# Patient Record
Sex: MALE | Race: BLACK OR AFRICAN AMERICAN | NOT HISPANIC OR LATINO | Employment: FULL TIME | URBAN - METROPOLITAN AREA
[De-identification: names, ages, dates, MRNs, and addresses within clinical notes are randomized per-mention and may not be internally consistent; named-entity substitution may affect disease eponyms.]

---

## 2019-05-11 ENCOUNTER — HOSPITAL ENCOUNTER (EMERGENCY)
Facility: HOSPITAL | Age: 49
Discharge: HOME | End: 2019-05-11
Attending: EMERGENCY MEDICINE
Payer: COMMERCIAL

## 2019-05-11 VITALS
OXYGEN SATURATION: 100 % | RESPIRATION RATE: 18 BRPM | TEMPERATURE: 98 F | DIASTOLIC BLOOD PRESSURE: 80 MMHG | WEIGHT: 175 LBS | SYSTOLIC BLOOD PRESSURE: 142 MMHG | HEIGHT: 70 IN | BODY MASS INDEX: 25.05 KG/M2 | HEART RATE: 61 BPM

## 2019-05-11 DIAGNOSIS — M25.512 LEFT SHOULDER PAIN: ICD-10-CM

## 2019-05-11 PROCEDURE — 99283 EMERGENCY DEPT VISIT LOW MDM: CPT

## 2019-05-11 NOTE — ED PROVIDER NOTES
"Encounter Date: 5/11/2019    SCRIBE #1 NOTE: I, Juju Duarte, am scribing for, and in the presence of,  Juanita Diana PA-C . I have scribed the following portions of the note - Other sections scribed: HPI, ROS.       History     Chief Complaint   Patient presents with    Shoulder Pain     "I was lifting something and it felt like something tore in my shoulder". Lt shoulder pain x 4 days.     CC: Shoulder Pain    HPI: This 49 y/o male presents to the ED for emergent evaluation of L shoulder pain for x4 days. Patient reports pain began after lifting a tire and "it felt like something tore in my shoulder." Patient denies hx of shoulder pain. Patient also denies arm pain, fever, headache, leg pain, chest pain, leg swelling, or N/V/D. No alleviating or exacerbating factors reported.        The history is provided by the patient. No  was used.     Review of patient's allergies indicates:  No Known Allergies  History reviewed. No pertinent past medical history.  Past Surgical History:   Procedure Laterality Date    THYROID SURGERY       History reviewed. No pertinent family history.  Social History     Tobacco Use    Smoking status: Current Some Day Smoker     Types: Cigars   Substance Use Topics    Alcohol use: Yes    Drug use: Not Currently     Review of Systems   Constitutional: Negative for chills and fever.   HENT: Negative for congestion, ear discharge, ear pain, nosebleeds, rhinorrhea and sore throat.    Respiratory: Negative for cough and chest tightness.    Cardiovascular: Negative for chest pain.   Gastrointestinal: Negative for abdominal pain, diarrhea, nausea and vomiting.   Genitourinary: Negative for dysuria, flank pain, hematuria and urgency.   Musculoskeletal: Negative for back pain, myalgias and neck pain.        + left shoulder pain   Skin: Negative for rash.   Neurological: Negative for dizziness, weakness, light-headedness, numbness and headaches.   Psychiatric/Behavioral: " Negative for agitation.       Physical Exam     Initial Vitals [05/11/19 0951]   BP Pulse Resp Temp SpO2   (!) 142/84 75 18 98.5 °F (36.9 °C) 100 %      MAP       --         Physical Exam    Nursing note and vitals reviewed.  Constitutional: Vital signs are normal. He appears well-developed and well-nourished. He is not diaphoretic. He is cooperative.  Non-toxic appearance. He does not have a sickly appearance. He does not appear ill. No distress.   HENT:   Head: Normocephalic and atraumatic.   Right Ear: External ear normal.   Left Ear: External ear normal.   Nose: Nose normal.   Mouth/Throat: Uvula is midline, oropharynx is clear and moist and mucous membranes are normal.   Eyes: Conjunctivae, EOM and lids are normal. Pupils are equal, round, and reactive to light.   Neck: Trachea normal, normal range of motion, full passive range of motion without pain and phonation normal. Neck supple.   Cardiovascular: Normal rate, regular rhythm, normal heart sounds and intact distal pulses. Exam reveals no gallop and no friction rub.    No murmur heard.  Pulses:       Radial pulses are 2+ on the right side, and 2+ on the left side.   Capillary refill less than 2 sec in bilateral upper extremities.   Pulmonary/Chest: Effort normal and breath sounds normal. No respiratory distress. He has no decreased breath sounds. He has no wheezes. He has no rhonchi. He has no rales.   Abdominal: Soft. Normal appearance and bowel sounds are normal. He exhibits no distension. There is no tenderness.   Musculoskeletal: Normal range of motion.        Left shoulder: He exhibits pain. He exhibits normal range of motion, no tenderness, no bony tenderness, no swelling, no effusion, no deformity, no laceration, no spasm, normal pulse and normal strength.        Arms:  There is pain of the left posterior shoulder with active ROM. No obvious deformity or evidence of trauma. Peripheral strength and sensation intact. Peripheral pulses intact. No  swelling.    Neurological: He is alert and oriented to person, place, and time. He has normal strength. No cranial nerve deficit or sensory deficit. He exhibits normal muscle tone. Coordination and gait normal. GCS eye subscore is 4. GCS verbal subscore is 5. GCS motor subscore is 6.   Skin: Skin is warm and dry. Capillary refill takes less than 2 seconds. No abrasion, no bruising, no burn, no ecchymosis, no laceration, no lesion, no rash and no abscess noted. No erythema.   Psychiatric: He has a normal mood and affect. His speech is normal and behavior is normal. Judgment and thought content normal. Cognition and memory are normal.         ED Course   Procedures  Labs Reviewed - No data to display       Imaging Results          X-Ray Shoulder Trauma Left (Final result)  Result time 05/11/19 11:16:00    Final result by Chilango Levine MD (05/11/19 11:16:00)                 Impression:      No evidence of fracture or dislocation.    Mild degenerative change of the AC joint.      Electronically signed by: Chilango Levine MD  Date:    05/11/2019  Time:    11:16             Narrative:    EXAMINATION:  XR SHOULDER TRAUMA 3 VIEW LEFT    CLINICAL HISTORY:  Pain in left shoulder    TECHNIQUE:  Three views of the shoulder were performed.    COMPARISON:  No priors    FINDINGS:  The osseous structures appear intact without evidence of a displaced fracture or osseous destructive lesion.  No evidence of dislocation.    Mild degenerative change of the AC joint.  No calcifications to suggest tendonitis.                                       APC / Resident Notes:   This is an evaluation of a 48 y.o. male that presents to the Emergency Department for left shoulder pain 2/2 lifting a tire 4 days ago.  Denies further symptoms.  Denies attempted treatment prior to arrival.    Patient is non-toxic, afebrile and well appearing. Exam findings consistent with left shoulder pain, likely 2/2 AC joint injury versus rotator cuff injury. Xray  left shoulder unrevealing for acute fracture dislocation.  There is mild degenerative changes of the AC joint noted. No destructive osseous process.    If available, past records have been reviewed.  Vitals are reassuring.    ED course:  Patient declined medications for pain. I have discussed exercises to increase shoulder flexibility including walk the wall exercise and pendulum exercise.  I will recommend Tylenol or NSAIDs for pain.  I have discussed follow-up with Orthopedics.  I feel this patient is stable for discharge.  ED return precautions given.    The diagnosis and treatment plan have been discussed with the patient. All questions and concerns have been addressed. Patient expressed understanding. An educational information sheet was given to the patient prior to discharge.     Juanita Diana PA-C         Scribe Attestation:   Scribe #1: I performed the above scribed service and the documentation accurately describes the services I performed. I attest to the accuracy of the note.    Attending Attestation:           Physician Attestation for Scribe:  Physician Attestation Statement for Scribe #1: I, Juanita Diana PA-C , reviewed documentation, as scribed by Juju Duarte in my presence, and it is both accurate and complete.                    Clinical Impression:       ICD-10-CM ICD-9-CM   1. Left shoulder pain M25.512 719.41         Disposition:   Disposition: Discharged  Condition: Stable                        Juanita Diana PA-C  05/11/19 [id number]

## 2019-05-11 NOTE — ED TRIAGE NOTES
"Patient reports left shoulder pain x 4 pain after lifting something heavy and hearing/feeling "a rip". Denies taking anything for the pain PTA.  "

## 2019-05-11 NOTE — DISCHARGE INSTRUCTIONS
Please do the shoulder exercises that we discussed--walk up the wall exercise and figure-of-8 exercise.    You can take ibuprofen or Tylenol for pain.    If your shoulder pain continues, you will need to follow up with Orthopedics.    Return to the emergency room for any concerns or emergencies.

## 2021-12-09 ENCOUNTER — TELEPHONE (OUTPATIENT)
Dept: ADMINISTRATIVE | Facility: OTHER | Age: 51
End: 2021-12-09
Payer: COMMERCIAL

## 2023-05-11 ENCOUNTER — OFFICE VISIT (OUTPATIENT)
Dept: INTERNAL MEDICINE | Facility: CLINIC | Age: 53
End: 2023-05-11
Payer: COMMERCIAL

## 2023-05-11 VITALS
HEIGHT: 70 IN | OXYGEN SATURATION: 98 % | DIASTOLIC BLOOD PRESSURE: 74 MMHG | WEIGHT: 168.88 LBS | SYSTOLIC BLOOD PRESSURE: 130 MMHG | HEART RATE: 64 BPM | BODY MASS INDEX: 24.18 KG/M2 | RESPIRATION RATE: 14 BRPM

## 2023-05-11 DIAGNOSIS — R22.42 LOCALIZED SWELLING OF LEFT FOOT: ICD-10-CM

## 2023-05-11 DIAGNOSIS — R21 RASH IN ADULT: Primary | ICD-10-CM

## 2023-05-11 PROCEDURE — 3008F BODY MASS INDEX DOCD: CPT | Mod: CPTII,S$GLB,,

## 2023-05-11 PROCEDURE — 1159F MED LIST DOCD IN RCRD: CPT | Mod: CPTII,S$GLB,,

## 2023-05-11 PROCEDURE — 3008F PR BODY MASS INDEX (BMI) DOCUMENTED: ICD-10-PCS | Mod: CPTII,S$GLB,,

## 2023-05-11 PROCEDURE — 3075F PR MOST RECENT SYSTOLIC BLOOD PRESS GE 130-139MM HG: ICD-10-PCS | Mod: CPTII,S$GLB,,

## 2023-05-11 PROCEDURE — 3078F DIAST BP <80 MM HG: CPT | Mod: CPTII,S$GLB,,

## 2023-05-11 PROCEDURE — 3078F PR MOST RECENT DIASTOLIC BLOOD PRESSURE < 80 MM HG: ICD-10-PCS | Mod: CPTII,S$GLB,,

## 2023-05-11 PROCEDURE — 1160F RVW MEDS BY RX/DR IN RCRD: CPT | Mod: CPTII,S$GLB,,

## 2023-05-11 PROCEDURE — 1159F PR MEDICATION LIST DOCUMENTED IN MEDICAL RECORD: ICD-10-PCS | Mod: CPTII,S$GLB,,

## 2023-05-11 PROCEDURE — 99999 PR PBB SHADOW E&M-EST. PATIENT-LVL IV: CPT | Mod: PBBFAC,,,

## 2023-05-11 PROCEDURE — 1160F PR REVIEW ALL MEDS BY PRESCRIBER/CLIN PHARMACIST DOCUMENTED: ICD-10-PCS | Mod: CPTII,S$GLB,,

## 2023-05-11 PROCEDURE — 3075F SYST BP GE 130 - 139MM HG: CPT | Mod: CPTII,S$GLB,,

## 2023-05-11 PROCEDURE — 99999 PR PBB SHADOW E&M-EST. PATIENT-LVL IV: ICD-10-PCS | Mod: PBBFAC,,,

## 2023-05-11 PROCEDURE — 99203 PR OFFICE/OUTPT VISIT, NEW, LEVL III, 30-44 MIN: ICD-10-PCS | Mod: S$GLB,,,

## 2023-05-11 PROCEDURE — 99203 OFFICE O/P NEW LOW 30 MIN: CPT | Mod: S$GLB,,,

## 2023-05-11 RX ORDER — HYDROCORTISONE 1 %
CREAM (GRAM) TOPICAL 2 TIMES DAILY
Qty: 15 G | Refills: 0 | Status: SHIPPED | OUTPATIENT
Start: 2023-05-11

## 2023-05-11 NOTE — PROGRESS NOTES
Clinic Note          Subjective     Chief Complaint:   Chief Complaint   Patient presents with    Rash     Rash at medial aspect of left foot        History of Present Illness:  Mr. Walt Buck Sr. is a 52 y.o. male with no significant history who presents to the clinic rash on his left foot.    Mr Buck is a pleasant gentleman without a PCP presenting for evaluation of foot rash and small nodule on plantar surface of his left foot. He is a  by trade and wears safety boots at work. Previous treated >1yr ago for similar rash with steroid cream. Otherwise, he is not currently on any medications. He is  with two children and would like to establish primary care.    RASH: left foot, medial aspect. 1.5 months. Similar to previous rash, attributed to sweat and work boots. Associated with itching but no pain or swelling. See below imaging.    FOOT NODULE: left plantar surface proximal to 1st MTP joint. Size is approximately 1cm in diameter and moves with flexion/extension of the 1st & 2nd toes. Not associated with pain, redness, or discomfort. Pt stated that he noticed it when examining the rash and was concerned it may be gout. Negative history of gout or trauma.    BP: 130/74mmHg. No current symptoms or medications.     EXERCISE & DIET:  No current exercise regimen with busy work schedule   well balanced.     Review of Systems   Constitutional:  Negative for chills and fever.   HENT:  Negative for congestion, hearing loss, sinus pain and sore throat.    Eyes:  Negative for blurred vision, photophobia and redness.   Respiratory:  Negative for cough, shortness of breath and wheezing.    Cardiovascular:  Negative for chest pain, palpitations and leg swelling.   Gastrointestinal:  Negative for blood in stool, constipation, diarrhea, nausea and vomiting.   Genitourinary:  Negative for dysuria and flank pain.   Musculoskeletal:  Negative for falls, joint pain and myalgias.   Skin:  Positive for itching and  "rash.        Left foot   Neurological:  Negative for dizziness, tingling, weakness and headaches.   Psychiatric/Behavioral:  Negative for depression. The patient is not nervous/anxious.       PAST HISTORY:     History reviewed. No pertinent past medical history.    Past Surgical History:   Procedure Laterality Date    THYROID SURGERY         MEDICATIONS & ALLERGIES:     No current outpatient medications on file prior to visit.     No current facility-administered medications on file prior to visit.       Review of patient's allergies indicates:  No Known Allergies    OBJECTIVE:     Vitals:    05/11/23 0820   BP: 130/74   BP Location: Left arm   Patient Position: Sitting   Pulse: 64   Resp: 14   SpO2: 98%   Weight: 76.6 kg (168 lb 14 oz)   Height: 5' 10" (1.778 m)       Body mass index is 24.23 kg/m².     Physical Exam  Vitals and nursing note reviewed.   Constitutional:       General: He is not in acute distress.     Appearance: Normal appearance. He is not ill-appearing.   HENT:      Head: Normocephalic and atraumatic.      Right Ear: External ear normal.      Left Ear: External ear normal.      Nose: Nose normal. No congestion or rhinorrhea.      Mouth/Throat:      Mouth: Mucous membranes are moist.      Pharynx: Oropharynx is clear.   Eyes:      General: No scleral icterus.        Right eye: No discharge.         Left eye: No discharge.      Extraocular Movements: Extraocular movements intact.      Conjunctiva/sclera: Conjunctivae normal.   Cardiovascular:      Rate and Rhythm: Normal rate and regular rhythm.      Pulses: Normal pulses.      Heart sounds: Normal heart sounds. No murmur heard.  Pulmonary:      Effort: Pulmonary effort is normal. No respiratory distress.      Breath sounds: No wheezing or rales.   Abdominal:      General: Abdomen is flat. Bowel sounds are normal. There is no distension.      Palpations: Abdomen is soft.      Tenderness: There is no abdominal tenderness. There is no guarding. "   Musculoskeletal:         General: No swelling, tenderness or deformity.      Cervical back: Neck supple. No rigidity or tenderness.      Right lower leg: No edema.      Left lower leg: No edema.   Feet:      Comments: Rash on medial aspect of left foot. Swollen nodule on plantar surface noted with no associated pain or erythema.  Skin:     General: Skin is warm.      Capillary Refill: Capillary refill takes less than 2 seconds.      Coloration: Skin is not jaundiced or pale.      Findings: Rash present. No bruising.   Neurological:      General: No focal deficit present.      Mental Status: He is alert and oriented to person, place, and time.      Cranial Nerves: No cranial nerve deficit.   Psychiatric:         Mood and Affect: Mood normal.         Behavior: Behavior normal.                ASSESSMENT & PLAN:   Mr. Walt Buck Sr. is a 52 y.o. male who was seen today in clinic for rash and nodule of the left foot. He would like to establish Primary Care as well. No significant history to report. Will scheduled a 6 month follow up for lab work and vaccinations/screenings.     Localized swelling of left foot  Small nodule on plantar surface of left foot appears cystic. Located in area of tendon vs bone. Nodule is localized and non-painful, less suspicious of fasciitis. No redness, swelling or joint involvement making infection or gout less likely. Physical exam significant for low arch. Recommended supportive foot inserts for work boots and everyday shoes. Encourage Pt to monitor for change in size and symptoms. Education provided on exercise routines  --Follow up symptoms and RTC in 6 months  --Consider podiatry consult for acute change in symptoms    Rash in adult  --Avoid prolonged exposure to moisture  --Change socks regularly. Wash thoroughly followed by application of anti-fungal application  --Two week therapy of terbinafine anti-fungal cream. Apply twice daily  --Hydrocortisone cream if symptoms persist  after anti-fungal treatment.     No labs today. Pt will return to establish care    F/U with the clinic scheduled for 6 months months.    Discussed with Dr Devlin - staff attestation to follow        Junior Fabian MD  Internal Medicine, PGY-2  [facility]  [address] [place] [address] [phone]

## 2023-05-11 NOTE — ASSESSMENT & PLAN NOTE
Small nodule on plantar surface of left foot appears cystic. Located in area of tendon vs bone. Nodule is localized and non-painful, less suspicious of fasciitis. No redness, swelling or joint involvement making infection or gout less likely. Physical exam significant for low arch. Recommended supportive foot inserts for work boots and everyday shoes. Encourage Pt to monitor for change in size and symptoms. Education provided on exercise routines  --Follow up symptoms and RTC in 6 months  --Consider podiatry consult for acute change in symptoms

## 2023-05-11 NOTE — PATIENT INSTRUCTIONS
Mr. Walt Buck .     Thank you for coming to the clinic today. No lab work for this visit. The following new medications will be started after this visit:  Terbinafine antifungal cream: twice daily for two weeks  Hydrocortisone cream: use twice daily if the antifungal cream is not effective.     Lastly, please notify a health care provider or present to the ED if you experience any of the following: temperature >100.4, persistent nausea/vomiting or diarrhea, severe chest pain/tightness, difficulty breathing or unrelenting cough, severe persistent headache, worsening rash, persistent dizziness/light-headedness or visual disturbances, increased confusion or weakness/debility.    Please message me if you have any outstanding questions and thanks for choosing [facility] as your health care provider.    Sincerely,    Junior Fabian MD  Internal Medicine PGY-2  [facility]

## 2023-05-11 NOTE — ASSESSMENT & PLAN NOTE
--Avoid prolonged exposure to moisture  --Change socks regularly. Wash thoroughly followed by application of anti-fungal application  --Two week therapy of terbinafine anti-fungal cream. Apply twice daily  --Hydrocortisone cream if symptoms persist after anti-fungal treatment.

## 2023-05-15 NOTE — PROGRESS NOTES
Patient's history and physical exam discussed. Please refer to resident physician's note for specific details. I agree with resident's assessment and plan.     Gage Devlin MD  Department of Internal Medicine  [facility]